# Patient Record
Sex: FEMALE | Race: WHITE | NOT HISPANIC OR LATINO | URBAN - NONMETROPOLITAN AREA
[De-identification: names, ages, dates, MRNs, and addresses within clinical notes are randomized per-mention and may not be internally consistent; named-entity substitution may affect disease eponyms.]

---

## 2018-05-29 NOTE — PATIENT DISCUSSION
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 Punctal/Canalicular/Nasolacrimal Duct Obstruction was suspected. After informed consent a punctal dilator was placed in the affected punctum, which was dilated appropriately. A 1cc syringe filled with sterile eyewash with a blunt canula was prepared the blunt canula was inserted into the canalicular system. The result of the irrigation was as follows: irrigates easily-partial obstruction.

## 2018-05-29 NOTE — PATIENT DISCUSSION
Punctal Dilation and Irrigation: The patient had a history of chronic epiphora on the right side. Punctal/Canalicular/Nasolacrimal Duct Obstruction was suspected. After informed consent a punctal dilator was placed in the affected punctum, which was dilated appropriately. A 1cc syringe filled with sterile eyewash with a blunt canula was prepared the blunt canula was inserted into the canalicular system. The result of the irrigation was as follows: irrigates easily-partial obstruction.

## 2018-05-29 NOTE — PATIENT DISCUSSION
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 A intranasal approach for a dacryocystorhinostomy was planned. The purpose of the nasal endoscopy was to evaluate a surgical site for abnormalities which could alter the surgical course including turbinate hypertrophy or septal deviation. A Stortz 0 degree endoscope was placed intranasally on both sides and the following observations were noted: normal mucosa, normal turbinates and no discharge.

## 2018-05-29 NOTE — PATIENT DISCUSSION
Nasolacrimal Duct Obstruction Counseling:  I have examined the patient and discussed or attempted dilation and irrigation of the nasolacrimal system. Obstruction to normal irrigation is found. The anatomy and causation of this problem was explained to the patient in detail. The risks and benefits of a dacryocystorhinostomy was discussed in detail, including the need for placement of a Del Real tubes for up to 6 months during the healing process. The patient understands and has had all questions answered and desires to proceed with the surgery as explained.

## 2018-08-28 NOTE — PATIENT DISCUSSION
Resume normal activity. Resume any medications that were discontinued for  surgery. Stop cold compresses. Use prescribed Flonase nasal spray bid in affected nostril(s) for 4 months. Felecia Ards Med Sinus Rinse q day for 4 months and prn congestion. At's prn for itching around tubes.   Plan f/u in 4 months for tube removal.

## 2018-09-04 PROBLEM — H25.13: Noted: 2018-09-04

## 2018-09-04 PROBLEM — H52.4: Noted: 2018-09-04

## 2018-12-18 NOTE — PATIENT DISCUSSION
The patient had a Del Real tube on the left side. Topical anesthetic drops were given to the affected eye. This was followed by severing the tube at the caruncle with courtney scissors. A lighted nasal speculum was introduced to the affected nostril and courtney sissors were used to cut the retaining 4.0 prolene suture. This was followed by introducing a duck-billed forceps. Under illumination by a lighted nasal speculum, the forceps were used to removed the retained silastic foreign body. The patient did well, and there were no complications.

## 2018-12-18 NOTE — PATIENT DISCUSSION
The patient had a Del Real tube on the right side. Topical anesthetic drops were given to the affected eye. This was followed by severing the tube at the caruncle with courtney scissors. A lighted nasal speculum was introduced to the affected nostril and courtney sissors were used to cut the retaining 4.0 prolene suture. This was followed by introducing a duck-billed forceps. Under illumination by a lighted nasal speculum, the forceps were used to removed the retained silastic foreign body. The patient did well, and there were no complications.

## 2019-09-10 ENCOUNTER — IMPORTED ENCOUNTER (OUTPATIENT)
Dept: URBAN - NONMETROPOLITAN AREA CLINIC 1 | Facility: CLINIC | Age: 64
End: 2019-09-10

## 2019-09-10 PROCEDURE — 92014 COMPRE OPH EXAM EST PT 1/>: CPT

## 2019-09-10 NOTE — PATIENT DISCUSSION
Cataract OU-Not yet surgical. -Reviewed symptoms of advancing cataract growth such as glare and halos and decreased vision.-Continue to monitor for now.  Pt will notify us if any new symptoms develop.mild progression

## 2021-06-04 NOTE — PATIENT DISCUSSION
Patient presents with intermittent tearing on today's exam. The exam shows moderate lower eyelid laxity, bilateral. The patient defers surgical intervention at this time, due to symptoms occurring intermittently.  If symptoms worsen,  she will call the office to be schedule for a lateral tarsal strip, bilateral.

## 2021-06-04 NOTE — PATIENT DISCUSSION
Punctal Dilation and Irrigation: The patient had a history of chronic epiphora on the right side. Punctal/Canalicular/Nasolacrimal Duct Obstruction was suspected. After informed consent a punctal dilator was placed in the affected punctum, which was dilated appropriately. A 1cc syringe filled with sterile eyewash with a blunt canula was prepared the blunt canula was inserted into the canalicular system. The result of the irrigation was as follows: irrigates easily today.

## 2021-06-04 NOTE — PATIENT DISCUSSION
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 A intranasal approach for a dacryocystorhinostomy was planned. The purpose of the nasal endoscopy was to evaluate a surgical site for abnormalities which could alter the surgical course including turbinate hypertrophy or septal deviation. A Stortz 0 degree endoscope was placed intranasally on both sides and the following observations were noted: normal turbinates, no inflammation can see osteotomy.

## 2021-06-04 NOTE — PATIENT DISCUSSION
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 Punctal/Canalicular/Nasolacrimal Duct Obstruction was suspected. After informed consent a punctal dilator was placed in the affected punctum, which was dilated appropriately. A 1cc syringe filled with sterile eyewash with a blunt canula was prepared the blunt canula was inserted into the canalicular system.   The result of the irrigation was as follows:

## 2022-04-10 ASSESSMENT — TONOMETRY
OD_IOP_MMHG: 16
OS_IOP_MMHG: 16

## 2022-04-10 ASSESSMENT — VISUAL ACUITY
OS_CC: 20/25
OD_CC: 20/25